# Patient Record
Sex: MALE | Race: OTHER | HISPANIC OR LATINO | Employment: FULL TIME | URBAN - METROPOLITAN AREA
[De-identification: names, ages, dates, MRNs, and addresses within clinical notes are randomized per-mention and may not be internally consistent; named-entity substitution may affect disease eponyms.]

---

## 2023-06-12 ENCOUNTER — TRANSCRIBE ORDERS (OUTPATIENT)
Dept: GASTROENTEROLOGY | Facility: CLINIC | Age: 43
End: 2023-06-12

## 2023-06-13 ENCOUNTER — TELEPHONE (OUTPATIENT)
Dept: UROLOGY | Facility: AMBULATORY SURGERY CENTER | Age: 43
End: 2023-06-13

## 2023-06-13 NOTE — TELEPHONE ENCOUNTER
Morning,    Received a referral and medical information from the [facility] for the patient  The referral has been entered into the patient appointment desk, and everything will soon be scanned into the patient chart  The patient needs to be scheduled for a new patient appointment diagnosis for the appointment to be is Poor urinary stream       That is everything needed, thank you for your help

## 2023-06-14 NOTE — TELEPHONE ENCOUNTER
TRIED TO SCHEDULE PT FOR AN APPT BUT COULDN'T LEAVE A MESSAGE BECAUSE MAILBOX WAS FULL  WILL TRY AGAIN LATER

## 2023-06-14 NOTE — TELEPHONE ENCOUNTER
Per [facility] documentation-    Requesting visit for weak urine stream  Recent PSa from 6/8/23 is 1 03  no relevant imaging       Can be scheduled next available new patient appt with either AP or MD (if in appropriate new patient spot)

## 2023-07-11 ENCOUNTER — APPOINTMENT (OUTPATIENT)
Dept: PHYSICAL THERAPY | Facility: REHABILITATION | Age: 43
End: 2023-07-11
Payer: COMMERCIAL

## 2023-07-13 ENCOUNTER — CONSULT (OUTPATIENT)
Dept: UROLOGY | Facility: CLINIC | Age: 43
End: 2023-07-13
Payer: COMMERCIAL

## 2023-07-13 ENCOUNTER — EVALUATION (OUTPATIENT)
Dept: PHYSICAL THERAPY | Facility: REHABILITATION | Age: 43
End: 2023-07-13
Payer: COMMERCIAL

## 2023-07-13 VITALS — DIASTOLIC BLOOD PRESSURE: 90 MMHG | WEIGHT: 171 LBS | SYSTOLIC BLOOD PRESSURE: 138 MMHG

## 2023-07-13 DIAGNOSIS — G89.29 CHRONIC MIDLINE LOW BACK PAIN WITHOUT SCIATICA: ICD-10-CM

## 2023-07-13 DIAGNOSIS — M54.50 CHRONIC MIDLINE LOW BACK PAIN WITHOUT SCIATICA: ICD-10-CM

## 2023-07-13 DIAGNOSIS — R39.12 WEAK URINARY STREAM: Primary | ICD-10-CM

## 2023-07-13 DIAGNOSIS — M54.51 VERTEBROGENIC LOW BACK PAIN: Primary | ICD-10-CM

## 2023-07-13 PROBLEM — R03.0 PRE-HYPERTENSION: Status: ACTIVE | Noted: 2023-07-13

## 2023-07-13 LAB — POST-VOID RESIDUAL VOLUME, ML POC: 23 ML

## 2023-07-13 PROCEDURE — 51798 US URINE CAPACITY MEASURE: CPT | Performed by: UROLOGY

## 2023-07-13 PROCEDURE — 97110 THERAPEUTIC EXERCISES: CPT | Performed by: PHYSICAL THERAPIST

## 2023-07-13 PROCEDURE — 99203 OFFICE O/P NEW LOW 30 MIN: CPT | Performed by: UROLOGY

## 2023-07-13 PROCEDURE — 97161 PT EVAL LOW COMPLEX 20 MIN: CPT | Performed by: PHYSICAL THERAPIST

## 2023-07-13 RX ORDER — TAMSULOSIN HYDROCHLORIDE 0.4 MG/1
0.4 CAPSULE ORAL
Qty: 30 CAPSULE | Refills: 11 | Status: SHIPPED | OUTPATIENT
Start: 2023-07-13 | End: 2023-07-19 | Stop reason: SDUPTHER

## 2023-07-13 RX ORDER — ACETAMINOPHEN 325 MG/1
650 TABLET ORAL EVERY 6 HOURS PRN
COMMUNITY

## 2023-07-13 RX ORDER — IBUPROFEN 200 MG
800 TABLET ORAL EVERY 6 HOURS PRN
COMMUNITY

## 2023-07-13 NOTE — PROGRESS NOTES
Assessment/Plan:    Weak urinary stream  Patient with lifelong issues of weak urinary stream but emptying his bladder fairly well based on PVR. We will try Flomax and schedule him for cystoscopy to evaluate for urethral stricture disease. I described Flomax and the other alpha blockers. I explained the mechanism of action and also the potential side effects including retrograde ejaculation, lower blood pressure (which is usually temporary) and risk for floppy iris syndrome which could be a problem (That can be mitigated) if having cataract surgery. It usually takes 1-2 weeks to see an effect. We using dose once a day and in select patients consider going to b.i.d. dosing. Subjective:      Patient ID: Boom Hidalgo is a 36 y.o. male. HPI  41-year-old male who comes in with lifelong complaint of weak urinary stream.    Patient said ever since he was a teenager he has noticed his urinary stream is weaker than his friends. He has a slow stream that takes a while to get started and is bothered by feelings of incomplete emptying. He also finds that his stream starts and stops quite often and he has to strain to void. He has some mild issues with urgency and frequency. AUA score 26/5. PVR today was 22 cc. He is not on any medications to help him void. Other medical issues other than hypertension for which she takes medication      History reviewed. No pertinent surgical history. History reviewed. No pertinent past medical history. Review of Systems   Constitutional: Negative for chills and fever. HENT: Negative for ear pain and sore throat. Eyes: Negative for pain and visual disturbance. Respiratory: Negative for cough and shortness of breath. Cardiovascular: Negative for chest pain and palpitations. Gastrointestinal: Negative for abdominal pain and vomiting. Genitourinary: Positive for difficulty urinating. Negative for dysuria and hematuria. Musculoskeletal: Negative for arthralgias and back pain. Skin: Negative for color change and rash. Neurological: Negative for seizures and syncope. All other systems reviewed and are negative. Objective:      /90 (BP Location: Left arm, Patient Position: Sitting, Cuff Size: Adult)   Wt 77.6 kg (171 lb)     No results found for: "PSA"       Physical Exam  Vitals reviewed. Constitutional:       General: He is not in acute distress. Appearance: Normal appearance. He is normal weight. He is not toxic-appearing. HENT:      Head: Normocephalic and atraumatic. Eyes:      Extraocular Movements: Extraocular movements intact. Conjunctiva/sclera: Conjunctivae normal.      Pupils: Pupils are equal, round, and reactive to light. Cardiovascular:      Rate and Rhythm: Normal rate. Pulses: Normal pulses. Pulmonary:      Effort: Pulmonary effort is normal.   Abdominal:      General: Abdomen is flat. Palpations: Abdomen is soft. Tenderness: There is no abdominal tenderness. There is no right CVA tenderness, left CVA tenderness, guarding or rebound. Genitourinary:     Penis: Normal.       Testes: Normal.      Prostate: Normal.      Comments: State 30 g T1c. No tenderness to palpation. Skin:     General: Skin is warm and dry. Neurological:      General: No focal deficit present. Mental Status: He is alert and oriented to person, place, and time. Mental status is at baseline. Psychiatric:         Mood and Affect: Mood normal.         Behavior: Behavior normal.         Thought Content:  Thought content normal.         Judgment: Judgment normal.           Orders  Orders Placed This Encounter   Procedures   • POCT Measure PVR

## 2023-07-13 NOTE — PROGRESS NOTES
PT Evaluation     Today's date: 2023  Patient name: Delaney Favre  : 1980  MRN: [id number]  Referring provider: Enriqueta Higgins MD  Dx:   Encounter Diagnosis     ICD-10-CM    1. Vertebrogenic low back pain  M54.51       2. Chronic midline low back pain without sciatica  M54.50     G89.29           Start Time: 0709  Stop Time: 8262  Total time in clinic (min): 41 minutes    Assessment  Assessment details: Delaney Favre is a 36y.o. year old male who presents to IE with: chronic midline low back pain. He presents with overall adequate lumbar range of motion. Some deficits in BLE/hip girdle strength noted. He will benefit from a focus on lumbar distraction and stabilization. King Winter presents with the impairments as listed above and would benefit from Physical Therapy to address these impairments and to maximize function. Impairments: abnormal or restricted ROM, activity intolerance, impaired physical strength, lacks appropriate home exercise program, pain with function and poor posture   Understanding of Dx/Px/POC: good   Prognosis: good    Goals  Short-Term Goals:   1. Patient will report <3/10 pain at rest.  2. Patient will demonstrate improved hip strength by 1-2 grades. Long-Term Goals:   1. Patient will be able to bend forward and lift at least 10# without limitation from LBP. 2. Patient will be able to sit for at least 30 minutes-1 hour to allow him to work without discomfort. 3. Patient independent with HEP at time of discharge. Plan  Plan details: Thank you for opportunity to participate in the care of Delaney Favre.     Patient would benefit from: skilled physical therapy and PT eval  Planned modality interventions: TENS, unattended electrical stimulation and thermotherapy: hydrocollator packs  Planned therapy interventions: abdominal trunk stabilization, flexibility, home exercise program, therapeutic exercise, therapeutic activities, stretching, strengthening, postural training, patient education, neuromuscular re-education, massage, manual therapy and joint mobilization  Frequency: 2x week  Duration in visits: 10  Duration in weeks: 5  Plan of Care beginning date: 7/13/2023  Plan of Care expiration date: 8/17/2023  Treatment plan discussed with: patient        Subjective Evaluation    History of Present Illness  Mechanism of injury: Patient is a 36 y.o. presenting to physical therapy with complaints of chronic low back pain. 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 He feels like it limits him with a lot of activities such as sports. He reports when he is sitting he feels a lot of pressure in his low back and feels like he constantly has to crack/twist his back. N/T/Radicular pain: has had pain in b/l HS in the past   Bowel/Bladder changes: none - referred to urology for enlarged prostate  Imaging: three MRIs - "herniated bulging disc"  Exercise: upper body lifting, walking  Patient Goals  Patient goals for therapy: decreased pain, increased motion, increased strength and return to sport/leisure activities  Patient goal: alleviate back pain   Pain  Current pain rating: 3  At best pain rating: 3  At worst pain rating: 10  Location: midline low back - can radiate out to b/l hips   Quality: pressure, sharp and knife-like  Relieving factors: change in position (flat surface, standing/walking)  Aggravating factors: sitting (twisting, bending)    Social Support    Employment status: working ([employer] -  at [employer] )  Treatments  Current treatment: physical therapy        Objective     Palpation   Left   No palpable tenderness to the erector spinae. Hypertonic in the erector spinae. Right   No palpable tenderness to the erector spinae. Hypertonic in the erector spinae.      Active Range of Motion     Additional Active Range of Motion Details  Lumbar AROM screen:  Flexion: WNL - pain upon return to stand  Extension: WNL - pain in midline low back  L sidebending: WNL - pain  R sidebending: WNL - pain   L rotation: WNL - pain   R rotation: WNL - pain      Passive Range of Motion     Additional Passive Range of Motion Details  Bilateral: ER 75%, flexion 75%   Passive IR = LBP    Joint Play     Hypomobile: T12, L1, L2, L3, L4, L5 and S1     Strength/Myotome Testing     Left Hip   Planes of Motion   Flexion: 4  Extension: 3+  Abduction: 4  External rotation: 4+  Internal rotation: 4    Right Hip   Planes of Motion   Flexion: 4  Extension: 3+  Abduction: 4  External rotation: 4+  Internal rotation: 4+    Left Knee   Flexion: 5  Extension: 5    Right Knee   Flexion: 5  Extension: 5    Left Ankle/Foot   Dorsiflexion: 5    Right Ankle/Foot   Dorsiflexion: 5    Tests     Lumbar     Left   Negative passive SLR. Right   Negative passive SLR. Left Hip   90/90 SLR: Positive. Right Hip   90/90 SLR: Positive.                    Precautions: n/a     Daily Treatment Diary     7/13          Manual           Lumbar distraction pball 90/90 nv          Lumbar   nv                                Neuro Re-Ed           TA nv          TA leg ext nv          Dead bug nv          Quadruped TA           Cat cow nv                     Ther Ex           Bike nv          Modified piriformis stretch 3x30" b/l          Prone press ups nv          SLR abduction nv          Bridging 10x          Clamshells           Standing lumbar ext's nv                                Ther Activity                                                                                                   Gait Training                                             Modalities           MHP                      * = on hep

## 2023-07-13 NOTE — ASSESSMENT & PLAN NOTE
Patient with lifelong issues of weak urinary stream but emptying his bladder fairly well based on PVR. We will try Flomax and schedule him for cystoscopy to evaluate for urethral stricture disease. I described Flomax and the other alpha blockers. I explained the mechanism of action and also the potential side effects including retrograde ejaculation, lower blood pressure (which is usually temporary) and risk for floppy iris syndrome which could be a problem (That can be mitigated) if having cataract surgery. It usually takes 1-2 weeks to see an effect. We using dose once a day and in select patients consider going to b.i.d. dosing.

## 2023-07-13 NOTE — LETTER
2023    Sarah Beth Campos, [address] [address]    Patient: Kamari Scott   YOB: 1980   Date of Visit: 2023     Encounter Diagnosis     ICD-10-CM    1. Vertebrogenic low back pain  M54.51       2. Chronic midline low back pain without sciatica  M54.50     G89.29           Dear Dr. Pool Freed: Thank you for your recent referral of Kamari Scott. Please review the attached evaluation summary from [facility] recent visit. Please verify that you agree with the plan of care by signing the attached order. If you have any questions or concerns, please do not hesitate to call. I sincerely appreciate the opportunity to share in the care of one of your patients and hope to have another opportunity to work with you in the near future. Sincerely,    Erik Boland, PT      Referring Provider:      I certify that I have read the below Plan of Care and certify the need for these services furnished under this plan of treatment while under my care. Sarah Beth Campos MD  [address] [place] [id number]  Via Fax: [phone]          PT Evaluation     Today's date: 2023  Patient name: Kamari Scott  : 1980  MRN: [id number]  Referring provider: Sarah Beth Campos MD  Dx:   Encounter Diagnosis     ICD-10-CM    1. Vertebrogenic low back pain  M54.51       2. Chronic midline low back pain without sciatica  M54.50     G89.29           Start Time: 0709  Stop Time: 4003  Total time in clinic (min): 41 minutes    Assessment  Assessment details: Kamari Scott is a 36y.o. year old male who presents to  with: chronic midline low back pain. He presents with overall adequate lumbar range of motion. Some deficits in BLE/hip girdle strength noted. He will benefit from a focus on lumbar distraction and stabilization.  Adarsh Liang presents with the impairments as listed above and would benefit from Physical Therapy to address these impairments and to maximize function. Impairments: abnormal or restricted ROM, activity intolerance, impaired physical strength, lacks appropriate home exercise program, pain with function and poor posture   Understanding of Dx/Px/POC: good   Prognosis: good    Goals  Short-Term Goals:   1. Patient will report <3/10 pain at rest.  2. Patient will demonstrate improved hip strength by 1-2 grades. Long-Term Goals:   1. Patient will be able to bend forward and lift at least 10# without limitation from LBP. 2. Patient will be able to sit for at least 30 minutes-1 hour to allow him to work without discomfort. 3. Patient independent with HEP at time of discharge. Plan  Plan details: Thank you for opportunity to participate in the care of Delaney Favre. Patient would benefit from: skilled physical therapy and PT eval  Planned modality interventions: TENS, unattended electrical stimulation and thermotherapy: hydrocollator packs  Planned therapy interventions: abdominal trunk stabilization, flexibility, home exercise program, therapeutic exercise, therapeutic activities, stretching, strengthening, postural training, patient education, neuromuscular re-education, massage, manual therapy and joint mobilization  Frequency: 2x week  Duration in visits: 10  Duration in weeks: 5  Plan of Care beginning date: 7/13/2023  Plan of Care expiration date: 8/17/2023  Treatment plan discussed with: patient        Subjective Evaluation    History of Present Illness  Mechanism of injury: Patient is a 36 y.o. presenting to physical therapy with complaints of chronic low back pain. 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 He feels like it limits him with a lot of activities such as sports.  He reports when he is sitting he feels a lot of pressure in his low back and feels like he constantly has to crack/twist his back.    N/T/Radicular pain: has had pain in b/l HS in the past   Bowel/Bladder changes: none - referred to urology for enlarged prostate  Imaging: three MRIs - "herniated bulging disc"  Exercise: upper body lifting, walking  Patient Goals  Patient goals for therapy: decreased pain, increased motion, increased strength and return to sport/leisure activities  Patient goal: alleviate back pain   Pain  Current pain rating: 3  At best pain rating: 3  At worst pain rating: 10  Location: midline low back - can radiate out to b/l hips   Quality: pressure, sharp and knife-like  Relieving factors: change in position (flat surface, standing/walking)  Aggravating factors: sitting (twisting, bending)    Social Support    Employment status: working ([employer]  at [employer] )  Treatments  Current treatment: physical therapy        Objective     Palpation   Left   No palpable tenderness to the erector spinae. Hypertonic in the erector spinae. Right   No palpable tenderness to the erector spinae. Hypertonic in the erector spinae. Active Range of Motion     Additional Active Range of Motion Details  Lumbar AROM screen:  Flexion: WNL - pain upon return to stand  Extension: WNL - pain in midline low back  L sidebending: WNL - pain  R sidebending:  WNL - pain   L rotation: WNL - pain   R rotation: WNL - pain      Passive Range of Motion     Additional Passive Range of Motion Details  Bilateral: ER 75%, flexion 75%   Passive IR = LBP    Joint Play     Hypomobile: T12, L1, L2, L3, L4, L5 and S1     Strength/Myotome Testing     Left Hip   Planes of Motion   Flexion: 4  Extension: 3+  Abduction: 4  External rotation: 4+  Internal rotation: 4    Right Hip   Planes of Motion   Flexion: 4  Extension: 3+  Abduction: 4  External rotation: 4+  Internal rotation: 4+    Left Knee   Flexion: 5  Extension: 5    Right Knee   Flexion: 5  Extension: 5    Left Ankle/Foot   Dorsiflexion: 5    Right Ankle/Foot   Dorsiflexion: 5    Tests     Lumbar     Left   Negative passive SLR. Right   Negative passive SLR. Left Hip   90/90 SLR: Positive. Right Hip   90/90 SLR: Positive.                  Precautions: n/a     Daily Treatment Diary     7/13          Manual           Lumbar distraction pball 90/90 nv          Lumbar   nv                                Neuro Re-Ed           TA nv          TA leg ext nv          Dead bug nv          Quadruped TA           Cat cow nv                     Ther Ex           Bike nv          Modified piriformis stretch 3x30" b/l          Prone press ups nv          SLR abduction nv          Bridging 10x          Clamshells           Standing lumbar ext's nv                                Ther Activity                                                                                                   Gait Training                                             Modalities           MHP                      * = on hep

## 2023-07-19 ENCOUNTER — OFFICE VISIT (OUTPATIENT)
Dept: PHYSICAL THERAPY | Facility: REHABILITATION | Age: 43
End: 2023-07-19
Payer: COMMERCIAL

## 2023-07-19 DIAGNOSIS — R39.12 WEAK URINARY STREAM: ICD-10-CM

## 2023-07-19 DIAGNOSIS — M54.50 CHRONIC MIDLINE LOW BACK PAIN WITHOUT SCIATICA: ICD-10-CM

## 2023-07-19 DIAGNOSIS — G89.29 CHRONIC MIDLINE LOW BACK PAIN WITHOUT SCIATICA: ICD-10-CM

## 2023-07-19 DIAGNOSIS — M54.51 VERTEBROGENIC LOW BACK PAIN: Primary | ICD-10-CM

## 2023-07-19 PROCEDURE — 97112 NEUROMUSCULAR REEDUCATION: CPT | Performed by: PHYSICAL THERAPIST

## 2023-07-19 PROCEDURE — 97110 THERAPEUTIC EXERCISES: CPT | Performed by: PHYSICAL THERAPIST

## 2023-07-19 PROCEDURE — 97140 MANUAL THERAPY 1/> REGIONS: CPT | Performed by: PHYSICAL THERAPIST

## 2023-07-19 NOTE — PROGRESS NOTES
Daily Note     Today's date: 2023  Patient name: Bonita Ramirez  : 1980  MRN: [id number]  Referring provider: Charity Mcdaniel MD  Dx:   Encounter Diagnosis     ICD-10-CM    1. Vertebrogenic low back pain  M54.51       2. Chronic midline low back pain without sciatica  M54.50     G89.29           Start Time: 1730  Stop Time: 1820  Total time in clinic (min): 50 minutes    Subjective: Patient reports the back has felt a little better. He came right from work stating "The back is still warm. When I go home and lay down it will bother me."      Objective: See treatment diary below      Assessment: Tolerated treatment well. Initiated plan of care this visit with a focus on deep core activation and lumbar extension bias with positive response. Advised patient to perform repetitive lumbar extensions intermittently throughout work day. Patient demonstrated fatigue post treatment, exhibited good technique with therapeutic exercises and would benefit from continued PT. Plan: Continue per plan of care.           Precautions: n/a     Daily Treatment Diary              Manual           Lumbar distraction pball 90/90 nv          Lumbar   nv Done                                Neuro Re-Ed           TA nv 10x5""         TA leg ext nv 10x         Dead bug nv nv         Quadruped TA           Cat cow nv 10x                    Ther Ex           Bike nv 5' lv 1         Modified piriformis stretch 3x30" b/l 3x30" b/l         Prone press ups nv 10x10"         SLR abduction nv 3x10 b/l         Bridging 10x 3x10         Clamshells           Standing lumbar ext's nv HEP at work                               Ther Activity                                                                                                   Gait Training                                             Modalities           MHP                      * = on hep

## 2023-07-20 RX ORDER — TAMSULOSIN HYDROCHLORIDE 0.4 MG/1
0.4 CAPSULE ORAL
Qty: 30 CAPSULE | Refills: 3 | Status: SHIPPED | OUTPATIENT
Start: 2023-07-20

## 2023-07-26 ENCOUNTER — APPOINTMENT (OUTPATIENT)
Dept: PHYSICAL THERAPY | Facility: REHABILITATION | Age: 43
End: 2023-07-26
Payer: COMMERCIAL

## 2023-07-31 ENCOUNTER — APPOINTMENT (OUTPATIENT)
Dept: PHYSICAL THERAPY | Facility: REHABILITATION | Age: 43
End: 2023-07-31
Payer: COMMERCIAL

## 2023-08-16 NOTE — TELEPHONE ENCOUNTER
Patient moved to [place] and never got the 1st prescription for Flomax. Could we send a new prescription to his local [address] [place] [address] until he gets established with them?     [facility]   [place], [address] [place] [address],[address] phone     CB: [phone]